# Patient Record
Sex: FEMALE | Race: WHITE | URBAN - METROPOLITAN AREA
[De-identification: names, ages, dates, MRNs, and addresses within clinical notes are randomized per-mention and may not be internally consistent; named-entity substitution may affect disease eponyms.]

---

## 2020-12-11 ENCOUNTER — OFFICE VISIT (OUTPATIENT)
Dept: URBAN - METROPOLITAN AREA CLINIC 73 | Facility: CLINIC | Age: 75
End: 2020-12-11
Payer: MEDICARE

## 2020-12-11 DIAGNOSIS — Z96.1 PRESENCE OF INTRAOCULAR LENS: ICD-10-CM

## 2020-12-11 PROCEDURE — 92134 CPTRZ OPH DX IMG PST SGM RTA: CPT | Performed by: OPHTHALMOLOGY

## 2020-12-11 PROCEDURE — 92014 COMPRE OPH EXAM EST PT 1/>: CPT | Performed by: OPHTHALMOLOGY

## 2020-12-11 ASSESSMENT — INTRAOCULAR PRESSURE
OD: 10
OS: 11

## 2020-12-11 NOTE — IMPRESSION/PLAN
Impression: Vitreous degeneration, left eye: H43.812. onset in Aug 2019 Plan: No retinal breaks were identified on exam.  The findings were discussed with the patient. The signs and symptoms of a retinal tear and detachment were again reviewed with the patient. The patient was advised to return if they noted any new floaters, flashing lights or a shadow in their vision.

## 2020-12-11 NOTE — IMPRESSION/PLAN
Impression: Type 2 diabetes mellitus without complications: X97.1. Plan: Thankfully, no ophthalmic manifestations at this time. BS/BP control emphasized with patient.

## 2020-12-11 NOTE — IMPRESSION/PLAN
Impression: Retinal edema: H35.81.
OCT OU 11/9/18 = rare IRF OD, resolved IRF OS  & 277 Plan: No evidence of CNV on OCT of either eye. No evidence of DR in either eye. Cataract surgery was March 2018 OD and Dec 2017 OS. OD: was improved on PF QID & Acular BID and had mild, rare IRF on PF/Acular BID, now stable, last drops one month ago OS: no gtt x 12m - stable = obs


3-6m OCT OU re-eval STK and monitor IOP -- call ASAP with AG changes

## 2021-06-11 ENCOUNTER — OFFICE VISIT (OUTPATIENT)
Dept: URBAN - METROPOLITAN AREA CLINIC 73 | Facility: CLINIC | Age: 76
End: 2021-06-11
Payer: MEDICARE

## 2021-06-11 DIAGNOSIS — H35.81 RETINAL EDEMA: Primary | ICD-10-CM

## 2021-06-11 DIAGNOSIS — H43.812 VITREOUS DEGENERATION, LEFT EYE: ICD-10-CM

## 2021-06-11 DIAGNOSIS — E11.9 TYPE 2 DIABETES MELLITUS WITHOUT COMPLICATIONS: ICD-10-CM

## 2021-06-11 PROCEDURE — 99214 OFFICE O/P EST MOD 30 MIN: CPT | Performed by: OPHTHALMOLOGY

## 2021-06-11 PROCEDURE — 92134 CPTRZ OPH DX IMG PST SGM RTA: CPT | Performed by: OPHTHALMOLOGY

## 2021-06-11 ASSESSMENT — INTRAOCULAR PRESSURE
OS: 11
OD: 6

## 2021-06-11 NOTE — IMPRESSION/PLAN
Impression: Type 2 diabetes mellitus without complications: H98.7. Plan: Thankfully, no ophthalmic manifestations at this time. BS/BP control emphasized with patient.

## 2021-06-11 NOTE — IMPRESSION/PLAN
Impression: Retinal edema: H35.81.
OCT OU 11/9/18 = rare IRF OD, resolved IRF OS  / 282 Plan: No evidence of CNV on OCT of either eye. No evidence of DR in either eye. Cataract surgery was March 2018 OD and Dec 2017 OS. OD: was improved on PF QID & Acular BID and had mild, rare IRF on PF/Acular BID, now stable, last drops Nov 2020 OS: no gtt x 12m - stable = obs


12m OCT OU re-eval STK

## 2021-07-30 ENCOUNTER — OFFICE VISIT (OUTPATIENT)
Dept: URBAN - METROPOLITAN AREA CLINIC 71 | Facility: CLINIC | Age: 76
End: 2021-07-30
Payer: MEDICARE

## 2021-07-30 DIAGNOSIS — H11.31 CONJUNCTIVAL HEMORRHAGE, RIGHT EYE: Primary | ICD-10-CM

## 2021-07-30 DIAGNOSIS — H04.123 DRY EYE SYNDROME OF BILATERAL LACRIMAL GLANDS: ICD-10-CM

## 2021-07-30 PROCEDURE — 99202 OFFICE O/P NEW SF 15 MIN: CPT | Performed by: OPHTHALMOLOGY

## 2021-07-30 ASSESSMENT — KERATOMETRY
OD: 43.13
OS: 43.75

## 2021-07-30 ASSESSMENT — INTRAOCULAR PRESSURE
OS: 14
OD: 14

## 2021-07-30 NOTE — IMPRESSION/PLAN
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 Reoccurring,  recommend using an OTC tears to keep the eye lubricated. Plan: Subconjunctival hemorrhages are caused by broken blood vessels on the surface of the eye. It is a superficial and benign condition, often caused by blood thinning medication like aspirin, Plavix, and Coumadin (warfarin). A subconjunctival hemorrhage will usually resolve with time, without the need for any specific treatment. Contact office if the subconjunctival hemorrhage does not resolve after 2-3 weeks, it appears to be growing, it causes pain or loss of vision.

## 2022-09-08 ENCOUNTER — OFFICE VISIT (OUTPATIENT)
Facility: LOCATION | Age: 77
End: 2022-09-08
Payer: MEDICARE

## 2022-09-08 DIAGNOSIS — E11.9 TYPE 2 DIABETES MELLITUS WITHOUT COMPLICATIONS: ICD-10-CM

## 2022-09-08 DIAGNOSIS — H35.81 RETINAL EDEMA: Primary | ICD-10-CM

## 2022-09-08 DIAGNOSIS — Z96.1 PRESENCE OF INTRAOCULAR LENS: ICD-10-CM

## 2022-09-08 DIAGNOSIS — H43.812 VITREOUS DEGENERATION, LEFT EYE: ICD-10-CM

## 2022-09-08 PROCEDURE — 92134 CPTRZ OPH DX IMG PST SGM RTA: CPT | Performed by: OPHTHALMOLOGY

## 2022-09-08 PROCEDURE — 99214 OFFICE O/P EST MOD 30 MIN: CPT | Performed by: OPHTHALMOLOGY

## 2022-09-08 ASSESSMENT — INTRAOCULAR PRESSURE
OD: 15
OS: 15

## 2022-09-08 NOTE — IMPRESSION/PLAN
Impression: Type 2 diabetes mellitus without complications: P90.1. Plan: Thankfully, no ophthalmic manifestations at this time. BS/BP control emphasized with patient.

## 2022-09-08 NOTE — IMPRESSION/PLAN
Impression: Retinal edema: H35.81.
OCT OU 11/9/18 = rare IRF OD,  no SRF/IRF OS  / 297 Plan: No evidence of CNV on OCT of either eye. No evidence of DR in either eye. Cataract surgery was March 2018 OD and Dec 2017 OS. OD: was improved on PF QID & Acular BID and had mild, rare IRF on PF/Acular BID, now stable, last drops Nov 2020 OS: no gtt x 2 years - stable = obs Rec yearly dilated exam with us or primary eye care provider 12m OCT OU

## 2023-12-28 ENCOUNTER — OFFICE VISIT (OUTPATIENT)
Facility: LOCATION | Age: 78
End: 2023-12-28
Payer: MEDICARE

## 2023-12-28 DIAGNOSIS — H35.81 RETINAL EDEMA: Primary | ICD-10-CM

## 2023-12-28 DIAGNOSIS — H43.812 VITREOUS DEGENERATION, LEFT EYE: ICD-10-CM

## 2023-12-28 DIAGNOSIS — Z96.1 PRESENCE OF INTRAOCULAR LENS: ICD-10-CM

## 2023-12-28 DIAGNOSIS — E11.9 TYPE 2 DIABETES MELLITUS WITHOUT COMPLICATIONS: ICD-10-CM

## 2023-12-28 PROCEDURE — 99212 OFFICE O/P EST SF 10 MIN: CPT | Performed by: OPHTHALMOLOGY

## 2023-12-28 PROCEDURE — 92134 CPTRZ OPH DX IMG PST SGM RTA: CPT | Performed by: OPHTHALMOLOGY

## 2023-12-28 ASSESSMENT — INTRAOCULAR PRESSURE
OS: 15
OD: 15

## 2025-08-12 ENCOUNTER — OFFICE VISIT (OUTPATIENT)
Facility: LOCATION | Age: 80
End: 2025-08-12
Payer: MEDICARE

## 2025-08-12 DIAGNOSIS — H35.81 RETINAL EDEMA: Primary | ICD-10-CM

## 2025-08-12 DIAGNOSIS — H43.812 VITREOUS DEGENERATION, LEFT EYE: ICD-10-CM

## 2025-08-12 DIAGNOSIS — Z96.1 PRESENCE OF INTRAOCULAR LENS: ICD-10-CM

## 2025-08-12 DIAGNOSIS — E11.9 TYPE 2 DIABETES MELLITUS WITHOUT COMPLICATIONS: ICD-10-CM

## 2025-08-12 PROCEDURE — 92134 CPTRZ OPH DX IMG PST SGM RTA: CPT | Performed by: STUDENT IN AN ORGANIZED HEALTH CARE EDUCATION/TRAINING PROGRAM

## 2025-08-12 PROCEDURE — 99214 OFFICE O/P EST MOD 30 MIN: CPT | Performed by: STUDENT IN AN ORGANIZED HEALTH CARE EDUCATION/TRAINING PROGRAM

## 2025-08-12 ASSESSMENT — INTRAOCULAR PRESSURE
OD: 16
OS: 23